# Patient Record
Sex: MALE | Race: WHITE | HISPANIC OR LATINO | URBAN - METROPOLITAN AREA
[De-identification: names, ages, dates, MRNs, and addresses within clinical notes are randomized per-mention and may not be internally consistent; named-entity substitution may affect disease eponyms.]

---

## 2020-07-05 ENCOUNTER — INPATIENT (INPATIENT)
Facility: HOSPITAL | Age: 40
LOS: 0 days | Discharge: HOME | End: 2020-07-06
Attending: INTERNAL MEDICINE | Admitting: INTERNAL MEDICINE
Payer: MEDICAID

## 2020-07-05 PROCEDURE — 99285 EMERGENCY DEPT VISIT HI MDM: CPT

## 2020-07-05 PROCEDURE — 71045 X-RAY EXAM CHEST 1 VIEW: CPT | Mod: 26

## 2020-07-05 PROCEDURE — 76705 ECHO EXAM OF ABDOMEN: CPT | Mod: 26

## 2020-07-06 PROCEDURE — 99255 IP/OBS CONSLTJ NEW/EST HI 80: CPT

## 2020-08-20 ENCOUNTER — EMERGENCY (EMERGENCY)
Facility: HOSPITAL | Age: 40
LOS: 1 days | End: 2020-08-20
Admitting: EMERGENCY MEDICINE
Payer: MEDICAID

## 2020-08-20 PROCEDURE — 99285 EMERGENCY DEPT VISIT HI MDM: CPT

## 2020-08-20 PROCEDURE — 71045 X-RAY EXAM CHEST 1 VIEW: CPT | Mod: 26

## 2020-08-20 PROCEDURE — 93010 ELECTROCARDIOGRAM REPORT: CPT

## 2020-08-20 PROCEDURE — 76705 ECHO EXAM OF ABDOMEN: CPT | Mod: 26

## 2020-08-20 PROCEDURE — 74177 CT ABD & PELVIS W/CONTRAST: CPT | Mod: 26

## 2020-08-26 ENCOUNTER — OUTPATIENT (OUTPATIENT)
Dept: OUTPATIENT SERVICES | Facility: HOSPITAL | Age: 40
LOS: 1 days | End: 2020-08-26

## 2020-09-15 ENCOUNTER — EMERGENCY (EMERGENCY)
Facility: HOSPITAL | Age: 40
LOS: 1 days | End: 2020-09-15
Admitting: EMERGENCY MEDICINE
Payer: MEDICAID

## 2020-09-15 PROCEDURE — 93010 ELECTROCARDIOGRAM REPORT: CPT

## 2020-09-15 PROCEDURE — 71046 X-RAY EXAM CHEST 2 VIEWS: CPT | Mod: 26

## 2020-09-15 PROCEDURE — 99285 EMERGENCY DEPT VISIT HI MDM: CPT

## 2020-09-16 PROCEDURE — 70450 CT HEAD/BRAIN W/O DYE: CPT | Mod: 26

## 2020-09-21 ENCOUNTER — NON-APPOINTMENT (OUTPATIENT)
Age: 40
End: 2020-09-21

## 2020-09-21 ENCOUNTER — APPOINTMENT (OUTPATIENT)
Dept: CARDIOLOGY | Facility: CLINIC | Age: 40
End: 2020-09-21
Payer: SELF-PAY

## 2020-09-21 VITALS
DIASTOLIC BLOOD PRESSURE: 80 MMHG | SYSTOLIC BLOOD PRESSURE: 134 MMHG | TEMPERATURE: 98.4 F | OXYGEN SATURATION: 100 % | HEIGHT: 67 IN | HEART RATE: 67 BPM | WEIGHT: 205 LBS | BODY MASS INDEX: 32.18 KG/M2

## 2020-09-21 DIAGNOSIS — Z78.9 OTHER SPECIFIED HEALTH STATUS: ICD-10-CM

## 2020-09-21 DIAGNOSIS — Z86.79 PERSONAL HISTORY OF OTHER DISEASES OF THE CIRCULATORY SYSTEM: ICD-10-CM

## 2020-09-21 DIAGNOSIS — R42 DIZZINESS AND GIDDINESS: ICD-10-CM

## 2020-09-21 DIAGNOSIS — Z87.19 PERSONAL HISTORY OF OTHER DISEASES OF THE DIGESTIVE SYSTEM: ICD-10-CM

## 2020-09-21 DIAGNOSIS — E87.6 HYPOKALEMIA: ICD-10-CM

## 2020-09-21 PROBLEM — Z00.00 ENCOUNTER FOR PREVENTIVE HEALTH EXAMINATION: Status: ACTIVE | Noted: 2020-09-21

## 2020-09-21 PROCEDURE — 99214 OFFICE O/P EST MOD 30 MIN: CPT

## 2020-09-21 NOTE — PHYSICAL EXAM
[General Appearance - Well Developed] : well developed [Normal Appearance] : normal appearance [Well Groomed] : well groomed [General Appearance - Well Nourished] : well nourished [No Deformities] : no deformities [General Appearance - In No Acute Distress] : no acute distress [Normal Conjunctiva] : the conjunctiva exhibited no abnormalities [Eyelids - No Xanthelasma] : the eyelids demonstrated no xanthelasmas [Normal Oral Mucosa] : normal oral mucosa [No Oral Pallor] : no oral pallor [No Oral Cyanosis] : no oral cyanosis [FreeTextEntry1] : No JVD, no carotid artery bruits auscultated bilaterally [Heart Rate And Rhythm] : heart rate and rhythm were normal [Heart Sounds] : normal S1 and S2 [Murmurs] : no murmurs present [Edema] : no peripheral edema present [Respiration, Rhythm And Depth] : normal respiratory rhythm and effort [Exaggerated Use Of Accessory Muscles For Inspiration] : no accessory muscle use [Auscultation Breath Sounds / Voice Sounds] : lungs were clear to auscultation bilaterally [Abnormal Walk] : normal gait [Gait - Sufficient For Exercise Testing] : the gait was sufficient for exercise testing [Nail Clubbing] : no clubbing of the fingernails [Cyanosis, Localized] : no localized cyanosis [Petechial Hemorrhages (___cm)] : no petechial hemorrhages [Skin Color & Pigmentation] : normal skin color and pigmentation [] : no rash [No Venous Stasis] : no venous stasis [Skin Lesions] : no skin lesions [No Skin Ulcers] : no skin ulcer [No Xanthoma] : no  xanthoma was observed [Oriented To Time, Place, And Person] : oriented to person, place, and time [Affect] : the affect was normal [Mood] : the mood was normal [No Anxiety] : not feeling anxious

## 2020-09-21 NOTE — DISCUSSION/SUMMARY
[FreeTextEntry1] : 1. Chest Pain/Dizziness: recommend ETT and echocardiogram. If cardiac testing returns normal, I recommended he follow up with his surgeon for further evaluation of his gallbladder.\par \par 2. HTN: controlled, but patient with hypokalemia on chlorthalidone. Recommend discontinuation of this medication and prescribe Losartan 50mg daily.\par \par Office will call patient with results. \par \par Follow up in 6 months.

## 2020-09-21 NOTE — REASON FOR VISIT
[Initial Evaluation] : an initial evaluation of [Chest Pain] : chest pain [Hypertension] : hypertension [Source: ______] : History obtained from [unfilled]

## 2020-09-21 NOTE — HISTORY OF PRESENT ILLNESS
[FreeTextEntry1] : 40 year old male with history of HTN presents for evaluation of chest pain. He states it has been occurring for 4 months. Always has a mild for of it but at times can become moderate in intensity. He can't figure out what makes it worse at times. It is not food or exertion. He has no associated SOB or palpitations. He does have dizziness at times that seems to occur intermittently. He has been seen in ED multiple times for this, most recently on 9/15/2020 at [facility] [place]. He had normal ECG and ruled out for MI. He states at one point he saw a surgeon who thought it might be his gallbladder, however he wants to make sure it is not his heart. Patient denies a history of MI, CVA, CHF, or previous coronary intervention. \par \par Patient has been on [id number] daily, which he states he has been getting from [place]. He has been taking it the last two years. Most recent labs on 9/15/2020 demonstrated hypokalemia at 3.1. Patient states he was told in the past his potassium level was low.

## 2020-09-25 ENCOUNTER — APPOINTMENT (OUTPATIENT)
Dept: CARDIOLOGY | Facility: CLINIC | Age: 40
End: 2020-09-25
Payer: SELF-PAY

## 2020-09-25 PROCEDURE — 93306 TTE W/DOPPLER COMPLETE: CPT

## 2020-10-21 ENCOUNTER — APPOINTMENT (OUTPATIENT)
Dept: CARDIOLOGY | Facility: CLINIC | Age: 40
End: 2020-10-21
Payer: SELF-PAY

## 2020-10-21 PROCEDURE — 93015 CV STRESS TEST SUPVJ I&R: CPT

## 2020-10-27 ENCOUNTER — NON-APPOINTMENT (OUTPATIENT)
Age: 40
End: 2020-10-27

## 2021-01-09 ENCOUNTER — EMERGENCY (EMERGENCY)
Facility: HOSPITAL | Age: 41
LOS: 1 days | End: 2021-01-09
Admitting: EMERGENCY MEDICINE
Payer: MEDICAID

## 2021-01-09 PROCEDURE — 99284 EMERGENCY DEPT VISIT MOD MDM: CPT

## 2021-01-09 PROCEDURE — 71045 X-RAY EXAM CHEST 1 VIEW: CPT | Mod: 26

## 2021-01-09 PROCEDURE — 93010 ELECTROCARDIOGRAM REPORT: CPT

## 2021-01-09 PROCEDURE — 74177 CT ABD & PELVIS W/CONTRAST: CPT | Mod: 26

## 2021-02-08 ENCOUNTER — EMERGENCY (EMERGENCY)
Facility: HOSPITAL | Age: 41
LOS: 1 days | End: 2021-02-08
Admitting: EMERGENCY MEDICINE
Payer: MEDICAID

## 2021-02-08 PROCEDURE — 93010 ELECTROCARDIOGRAM REPORT: CPT

## 2021-02-08 PROCEDURE — 70450 CT HEAD/BRAIN W/O DYE: CPT | Mod: 26

## 2021-02-08 PROCEDURE — 71045 X-RAY EXAM CHEST 1 VIEW: CPT | Mod: 26

## 2021-02-08 PROCEDURE — 99285 EMERGENCY DEPT VISIT HI MDM: CPT

## 2021-04-16 ENCOUNTER — APPOINTMENT (OUTPATIENT)
Age: 41
End: 2021-04-16
Payer: COMMERCIAL

## 2021-04-16 PROCEDURE — 0001A: CPT

## 2021-04-17 ENCOUNTER — EMERGENCY (EMERGENCY)
Facility: HOSPITAL | Age: 41
LOS: 1 days | End: 2021-04-17
Admitting: EMERGENCY MEDICINE
Payer: MEDICAID

## 2021-04-17 PROCEDURE — 71046 X-RAY EXAM CHEST 2 VIEWS: CPT | Mod: 26

## 2021-04-17 PROCEDURE — 99284 EMERGENCY DEPT VISIT MOD MDM: CPT

## 2021-04-17 PROCEDURE — 93010 ELECTROCARDIOGRAM REPORT: CPT

## 2021-04-17 PROCEDURE — 76705 ECHO EXAM OF ABDOMEN: CPT | Mod: 26

## 2021-05-07 ENCOUNTER — APPOINTMENT (OUTPATIENT)
Age: 41
End: 2021-05-07
Payer: MEDICAID

## 2021-05-07 PROCEDURE — 0002A: CPT

## 2021-05-10 PROCEDURE — 76705 ECHO EXAM OF ABDOMEN: CPT | Mod: 26

## 2021-05-10 PROCEDURE — 93010 ELECTROCARDIOGRAM REPORT: CPT | Mod: 76

## 2021-05-10 PROCEDURE — 71045 X-RAY EXAM CHEST 1 VIEW: CPT | Mod: 26

## 2021-05-10 PROCEDURE — 99285 EMERGENCY DEPT VISIT HI MDM: CPT

## 2021-05-11 ENCOUNTER — INPATIENT (INPATIENT)
Facility: HOSPITAL | Age: 41
LOS: 0 days | Discharge: HOME | End: 2021-05-12
Attending: INTERNAL MEDICINE | Admitting: STUDENT IN AN ORGANIZED HEALTH CARE EDUCATION/TRAINING PROGRAM
Payer: MEDICAID

## 2021-05-11 PROCEDURE — 78226 HEPATOBILIARY SYSTEM IMAGING: CPT | Mod: 26

## 2021-05-12 ENCOUNTER — TRANSCRIPTION ENCOUNTER (OUTPATIENT)
Age: 41
End: 2021-05-12

## 2021-05-17 ENCOUNTER — NON-APPOINTMENT (OUTPATIENT)
Age: 41
End: 2021-05-17

## 2021-05-17 ENCOUNTER — APPOINTMENT (OUTPATIENT)
Dept: CARDIOLOGY | Facility: CLINIC | Age: 41
End: 2021-05-17
Payer: SELF-PAY

## 2021-05-17 VITALS
HEIGHT: 67 IN | BODY MASS INDEX: 32.96 KG/M2 | DIASTOLIC BLOOD PRESSURE: 70 MMHG | WEIGHT: 210 LBS | HEART RATE: 70 BPM | SYSTOLIC BLOOD PRESSURE: 122 MMHG | OXYGEN SATURATION: 98 % | TEMPERATURE: 97.6 F

## 2021-05-17 PROCEDURE — 99213 OFFICE O/P EST LOW 20 MIN: CPT

## 2021-05-17 PROCEDURE — 93000 ELECTROCARDIOGRAM COMPLETE: CPT

## 2021-05-17 NOTE — HISTORY OF PRESENT ILLNESS
[FreeTextEntry1] : Historical Perspective:\par 40 year old male with history of HTN presents for evaluation of chest pain. He states it has been occurring for 4 months. Always has a mild for of it but at times can become moderate in intensity. He can't figure out what makes it worse at times. It is not food or exertion. He has no associated SOB or palpitations. He does have dizziness at times that seems to occur intermittently. He has been seen in ED multiple times for this, most recently on 9/15/2020 at [facility] [place]. He had normal ECG and ruled out for MI. He states at one point he saw a surgeon who thought it might be his gallbladder, however he wants to make sure it is not his heart. Patient denies a history of MI, CVA, CHF, or previous coronary intervention. \par \par Patient has been on [id number] daily, which he states he has been getting from [place]. He has been taking it the last two years. Most recent labs on 9/15/2020 demonstrated hypokalemia at 3.1. Patient states he was told in the past his potassium level was low. \par \par Current Health Status:\par Patient returns to office. Continue with constant chest pain. A surgeon told him he needs his gallbladder taken out. Patient holding off on surgery for now. Started on PPI therapy. Has limited benefit on his chest pain.

## 2021-05-17 NOTE — DISCUSSION/SUMMARY
[FreeTextEntry1] : 1. Chest Pain: suspect GI etiology. Normal ETT and echocardiogram fall of 2020.  I recommended he follow up with his surgeon for further evaluation of his gallbladder.\par \par 2. HTN: controlled, but patient with hypokalemia on chlorthalidone. Recommended discontinuation of this medication and prescribed Losartan 50mg daily. Tolerating well with no adverse effects.

## 2021-05-17 NOTE — PHYSICAL EXAM
[Normal] : moves all extremities, no focal deficits, normal speech [de-identified] : No JVD, no carotid artery bruits auscultated bilaterally

## 2021-05-17 NOTE — REVIEW OF SYSTEMS
[Joint Pain] : joint pain [Joint Stiffness] : joint stiffness [Negative] : Respiratory [FreeTextEntry5] : see HPI [FreeTextEntry7] : see HPI

## 2021-05-17 NOTE — CARDIOLOGY SUMMARY
[de-identified] : 5/17/2021, NSR [de-identified] : 10/21/2020, Plain Treadmill Stress Test: Exercised for 11 minutes and 3 seconds utilizing Standard Jad Protocol. Chest Pain improved with exercise.  No ECG changes to suggest ischemia.\par  [de-identified] : 9/25/2020, LV EF 60%, mild MR, mild TR with estimated PASP of 41mmHg.

## 2021-06-28 ENCOUNTER — TRANSCRIPTION ENCOUNTER (OUTPATIENT)
Age: 41
End: 2021-06-28

## 2021-07-23 ENCOUNTER — EMERGENCY (EMERGENCY)
Facility: HOSPITAL | Age: 41
LOS: 1 days | End: 2021-07-23
Admitting: EMERGENCY MEDICINE
Payer: MEDICAID

## 2021-07-23 PROCEDURE — 71045 X-RAY EXAM CHEST 1 VIEW: CPT | Mod: 26

## 2021-07-23 PROCEDURE — 99284 EMERGENCY DEPT VISIT MOD MDM: CPT

## 2021-07-24 PROCEDURE — 93010 ELECTROCARDIOGRAM REPORT: CPT

## 2022-01-15 ENCOUNTER — EMERGENCY (EMERGENCY)
Facility: HOSPITAL | Age: 42
LOS: 1 days | Discharge: HOME | End: 2022-01-15
Admitting: EMERGENCY MEDICINE
Payer: MEDICAID

## 2022-01-15 PROCEDURE — 99285 EMERGENCY DEPT VISIT HI MDM: CPT

## 2022-01-15 PROCEDURE — 93010 ELECTROCARDIOGRAM REPORT: CPT

## 2022-01-15 PROCEDURE — 71045 X-RAY EXAM CHEST 1 VIEW: CPT | Mod: 26

## 2022-01-27 DIAGNOSIS — R51.9 HEADACHE, UNSPECIFIED: ICD-10-CM

## 2022-01-27 DIAGNOSIS — U07.1 COVID-19: ICD-10-CM

## 2022-01-27 DIAGNOSIS — R07.89 OTHER CHEST PAIN: ICD-10-CM

## 2022-01-27 DIAGNOSIS — I10 ESSENTIAL (PRIMARY) HYPERTENSION: ICD-10-CM

## 2022-01-27 DIAGNOSIS — Z86.16 PERSONAL HISTORY OF COVID-19: ICD-10-CM

## 2022-01-27 DIAGNOSIS — M54.50 LOW BACK PAIN, UNSPECIFIED: ICD-10-CM

## 2022-05-24 ENCOUNTER — NON-APPOINTMENT (OUTPATIENT)
Age: 42
End: 2022-05-24

## 2022-05-24 ENCOUNTER — APPOINTMENT (OUTPATIENT)
Dept: CARDIOLOGY | Facility: CLINIC | Age: 42
End: 2022-05-24
Payer: MEDICAID

## 2022-05-24 VITALS
WEIGHT: 225 LBS | DIASTOLIC BLOOD PRESSURE: 82 MMHG | OXYGEN SATURATION: 97 % | HEIGHT: 68 IN | BODY MASS INDEX: 34.1 KG/M2 | HEART RATE: 68 BPM | SYSTOLIC BLOOD PRESSURE: 140 MMHG

## 2022-05-24 PROCEDURE — 93000 ELECTROCARDIOGRAM COMPLETE: CPT

## 2022-05-24 PROCEDURE — 99214 OFFICE O/P EST MOD 30 MIN: CPT | Mod: 25

## 2022-05-24 RX ORDER — SIMVASTATIN 10 MG/1
10 TABLET, FILM COATED ORAL
Qty: 30 | Refills: 0 | Status: ACTIVE | COMMUNITY
Start: 2022-01-10

## 2022-05-24 RX ORDER — PANTOPRAZOLE 40 MG/1
40 TABLET, DELAYED RELEASE ORAL
Refills: 0 | Status: DISCONTINUED | COMMUNITY
End: 2022-05-24

## 2022-05-24 NOTE — HISTORY OF PRESENT ILLNESS
[FreeTextEntry1] : Historical Perspective:\par 41 year old male with history of HTN presents for evaluation of chest pain. He states it has been occurring for 4 months. Always has a mild for of it but at times can become moderate in intensity. He can't figure out what makes it worse at times. It is not food or exertion. He has no associated SOB or palpitations. He does have dizziness at times that seems to occur intermittently. He has been seen in ED multiple times for this, most recently on 9/15/2020 at [facility] [place]. He had normal ECG and ruled out for MI. He states at one point he saw a surgeon who thought it might be his gallbladder, however he wants to make sure it is not his heart. Patient denies a history of MI, CVA, CHF, or previous coronary intervention. \par \par Patient has been on [id number] daily, which he states he has been getting from [place]. He has been taking it the last two years. Most recent labs on 9/15/2020 demonstrated hypokalemia at 3.1. Patient states he was told in the past his potassium level was low. \par \par Current Health Status:\par Patient returns with chest pain. Located left side of chest and down his left arm. Appears stronger than before. When he moves his arm it hurts. No associated SOB.

## 2022-05-24 NOTE — CARDIOLOGY SUMMARY
[de-identified] : 5/24/2022, NSR [de-identified] : 10/21/2020, Plain Treadmill Stress Test: Exercised for 11 minutes and 3 seconds utilizing Standard Jad Protocol. Chest Pain improved with exercise.  No ECG changes to suggest ischemia.\par  [de-identified] : 9/25/2020, LV EF 60%, mild MR, mild TR with estimated PASP of 41mmHg.

## 2022-05-24 NOTE — DISCUSSION/SUMMARY
[FreeTextEntry1] : 1. Chest Pain: recommend stress echo, since ETT in the past was normal and patient continues with symptoms. Recommend repeating TTE. If both tests are normal recommend follow up with PCP regarding other etiologies to his symptoms. \par \par 2. HTN: controlled, but patient with hypokalemia on chlorthalidone. Recommended discontinuation of this medication and prescribed Losartan 50mg daily. Tolerating well with no adverse effects.\par \par 3. HLD: continue simvastatin 10mg daily.\par \par Office will call with results.\par \par Follow up in 1 year.

## 2022-05-24 NOTE — PHYSICAL EXAM
[Normal] : moves all extremities, no focal deficits, normal speech [de-identified] : No carotid artery bruits auscultated bilaterally

## 2022-06-09 ENCOUNTER — APPOINTMENT (OUTPATIENT)
Dept: CARDIOLOGY | Facility: CLINIC | Age: 42
End: 2022-06-09
Payer: SELF-PAY

## 2022-06-09 PROCEDURE — 93306 TTE W/DOPPLER COMPLETE: CPT

## 2022-06-10 ENCOUNTER — EMERGENCY (EMERGENCY)
Facility: HOSPITAL | Age: 42
LOS: 1 days | Discharge: HOME | End: 2022-06-10
Admitting: EMERGENCY MEDICINE
Payer: MEDICAID

## 2022-06-10 DIAGNOSIS — I10 ESSENTIAL (PRIMARY) HYPERTENSION: ICD-10-CM

## 2022-06-10 DIAGNOSIS — R10.13 EPIGASTRIC PAIN: ICD-10-CM

## 2022-06-10 DIAGNOSIS — J02.8 ACUTE PHARYNGITIS DUE TO OTHER SPECIFIED ORGANISMS: ICD-10-CM

## 2022-06-10 DIAGNOSIS — K21.9 GASTRO-ESOPHAGEAL REFLUX DISEASE WITHOUT ESOPHAGITIS: ICD-10-CM

## 2022-06-10 DIAGNOSIS — R07.0 PAIN IN THROAT: ICD-10-CM

## 2022-06-10 PROCEDURE — 99285 EMERGENCY DEPT VISIT HI MDM: CPT

## 2022-06-11 PROCEDURE — 76705 ECHO EXAM OF ABDOMEN: CPT | Mod: 26

## 2022-06-22 ENCOUNTER — APPOINTMENT (OUTPATIENT)
Dept: CARDIOLOGY | Facility: CLINIC | Age: 42
End: 2022-06-22
Payer: SELF-PAY

## 2022-06-22 PROCEDURE — 93351 STRESS TTE COMPLETE: CPT

## 2022-06-22 PROCEDURE — 93320 DOPPLER ECHO COMPLETE: CPT

## 2022-06-23 ENCOUNTER — NON-APPOINTMENT (OUTPATIENT)
Age: 42
End: 2022-06-23

## 2022-07-21 RX ORDER — LOSARTAN POTASSIUM 50 MG/1
50 TABLET, FILM COATED ORAL DAILY
Qty: 90 | Refills: 2 | Status: ACTIVE | COMMUNITY
Start: 2020-09-21 | End: 1900-01-01

## 2023-05-23 ENCOUNTER — NON-APPOINTMENT (OUTPATIENT)
Age: 43
End: 2023-05-23

## 2023-05-23 ENCOUNTER — APPOINTMENT (OUTPATIENT)
Dept: CARDIOLOGY | Facility: CLINIC | Age: 43
End: 2023-05-23
Payer: SELF-PAY

## 2023-05-23 VITALS
OXYGEN SATURATION: 98 % | DIASTOLIC BLOOD PRESSURE: 80 MMHG | HEIGHT: 68 IN | SYSTOLIC BLOOD PRESSURE: 136 MMHG | BODY MASS INDEX: 34.1 KG/M2 | WEIGHT: 225 LBS | HEART RATE: 64 BPM

## 2023-05-23 DIAGNOSIS — E11.9 TYPE 2 DIABETES MELLITUS W/OUT COMPLICATIONS: ICD-10-CM

## 2023-05-23 PROCEDURE — 93000 ELECTROCARDIOGRAM COMPLETE: CPT | Mod: NC

## 2023-05-23 PROCEDURE — 99214 OFFICE O/P EST MOD 30 MIN: CPT

## 2023-05-23 RX ORDER — METFORMIN HYDROCHLORIDE 500 MG/1
500 TABLET, EXTENDED RELEASE ORAL DAILY
Refills: 0 | Status: ACTIVE | COMMUNITY

## 2023-05-23 NOTE — PHYSICAL EXAM
[Normal] : moves all extremities, no focal deficits, normal speech [de-identified] : No carotid artery bruits auscultated bilaterally

## 2023-05-23 NOTE — HISTORY OF PRESENT ILLNESS
[FreeTextEntry1] : Historical Perspective:\par 42 year old male with history of HTN presents for evaluation of chest pain. He states it has been occurring for 4 months. Always has a mild for of it but at times can become moderate in intensity. He can't figure out what makes it worse at times. It is not food or exertion. He has no associated SOB or palpitations. He does have dizziness at times that seems to occur intermittently. He has been seen in ED multiple times for this, most recently on 9/15/2020 at [facility] [place]. He had normal ECG and ruled out for MI. He states at one point he saw a surgeon who thought it might be his gallbladder, however he wants to make sure it is not his heart. Patient denies a history of MI, CVA, CHF, or previous coronary intervention. \par \par Patient has been on [id number] daily, which he states he has been getting from [place]. He has been taking it the last two years. Most recent labs on 9/15/2020 demonstrated hypokalemia at 3.1. Patient states he was told in the past his potassium level was low. \par \par Current Health Status:\par Patient returns with chest pain. Located left side of chest and down his left arm. Appears stronger than before. When he moves his arm it hurts. No associated SOB.

## 2023-05-23 NOTE — CARDIOLOGY SUMMARY
[de-identified] : 5/23/2023, NSR, normal ECG\par 5/24/2022, NSR [de-identified] : 6/22/2022, Stress Echocardiogram: Exercised for 10 minutes utilizing Standard Jad Protocol. No chest pain or abnormal dyspnea. No ECG changes to suggest ischemia. Normal LV augmentation with stress.\par 10/21/2020, Plain Treadmill Stress Test: Exercised for 11 minutes and 3 seconds utilizing Standard Jad Protocol. Chest Pain improved with exercise.  No ECG changes to suggest ischemia.\par  [de-identified] : 6/9/2022, LV EF 60-65%, mild MR, normal LV diastolic function, trace TR with estimated PASP of 16mmHg.\par 9/25/2020, LV EF 60%, mild MR, mild TR with estimated PASP of 41mmHg.

## 2023-05-23 NOTE — DISCUSSION/SUMMARY
[EKG obtained to assist in diagnosis and management of assessed problem(s)] : EKG obtained to assist in diagnosis and management of assessed problem(s) [FreeTextEntry1] : 1. Chest Pain: recommended stress echo, since ETT in the past was normal and patient continued with symptoms. The stress echocardiogram was within normal limits. His chest pain appears non-cardiac and musculoskeletal in nature. \par \par 2. HTN: controlled, but patient with hypokalemia on chlorthalidone. Recommended discontinuation of this medication and prescribed Losartan 50mg daily. Tolerating well with no adverse effects.\par \par 3. HLD: continue simvastatin 10mg daily.\par \par Follow up in 1 year.

## 2024-04-24 ENCOUNTER — NON-APPOINTMENT (OUTPATIENT)
Age: 44
End: 2024-04-24

## 2024-04-24 ENCOUNTER — APPOINTMENT (OUTPATIENT)
Dept: CARDIOLOGY | Facility: CLINIC | Age: 44
End: 2024-04-24
Payer: SELF-PAY

## 2024-04-24 VITALS
DIASTOLIC BLOOD PRESSURE: 80 MMHG | WEIGHT: 220 LBS | HEART RATE: 74 BPM | OXYGEN SATURATION: 98 % | HEIGHT: 68 IN | BODY MASS INDEX: 33.34 KG/M2 | SYSTOLIC BLOOD PRESSURE: 122 MMHG

## 2024-04-24 DIAGNOSIS — R07.9 CHEST PAIN, UNSPECIFIED: ICD-10-CM

## 2024-04-24 DIAGNOSIS — E78.00 PURE HYPERCHOLESTEROLEMIA, UNSPECIFIED: ICD-10-CM

## 2024-04-24 DIAGNOSIS — I10 ESSENTIAL (PRIMARY) HYPERTENSION: ICD-10-CM

## 2024-04-24 PROCEDURE — G2211 COMPLEX E/M VISIT ADD ON: CPT

## 2024-04-24 PROCEDURE — 99214 OFFICE O/P EST MOD 30 MIN: CPT

## 2024-04-24 PROCEDURE — 93000 ELECTROCARDIOGRAM COMPLETE: CPT

## 2024-04-24 NOTE — HISTORY OF PRESENT ILLNESS
[FreeTextEntry1] : Historical Perspective: 43 year old male with history of HTN presents for evaluation of chest pain. He states it has been occurring for 4 months. Always has a mild for of it but at times can become moderate in intensity. He can't figure out what makes it worse at times. It is not food or exertion. He has no associated SOB or palpitations. He does have dizziness at times that seems to occur intermittently. He has been seen in ED multiple times for this, most recently on 9/15/2020 at [facility] [place]. He had normal ECG and ruled out for MI. He states at one point he saw a surgeon who thought it might be his gallbladder, however he wants to make sure it is not his heart. Patient denies a history of MI, CVA, CHF, or previous coronary intervention.   Patient has been on [id number] daily, which he states he has been getting from [place]. He has been taking it the last two years. Most recent labs on 9/15/2020 demonstrated hypokalemia at 3.1. Patient states he was told in the past his potassium level was low.   Current Health Status: Patient returns with chest pain. Located left side of chest and down his left arm.  No hospitalizations since seeing me last. Remains compliant with medications and reports no adverse effects.

## 2024-04-24 NOTE — DISCUSSION/SUMMARY
[FreeTextEntry1] : 1. Chest Pain: recommended stress echo, since ETT in the past was normal and patient continued with symptoms. The stress echocardiogram was within normal limits. He continues with chest pain. Recommend TTE+CTA of Coronary Arteries.  2. HTN: controlled, but patient with hypokalemia on chlorthalidone. Recommended discontinuation of this medication and prescribed Losartan 50mg daily. Tolerating well with no adverse effects.  3. HLD: continue simvastatin 10mg daily.  Office will call with results.  Follow up in 1 year. [EKG obtained to assist in diagnosis and management of assessed problem(s)] : EKG obtained to assist in diagnosis and management of assessed problem(s)

## 2024-04-24 NOTE — CARDIOLOGY SUMMARY
[de-identified] : 4/24/2024, NSR, normal ECG 5/23/2023, NSR, normal ECG 5/24/2022, NSR [de-identified] : 6/22/2022, Stress Echocardiogram: Exercised for 10 minutes utilizing Standard Jad Protocol. No chest pain or abnormal dyspnea. No ECG changes to suggest ischemia. Normal LV augmentation with stress.\par  10/21/2020, Plain Treadmill Stress Test: Exercised for 11 minutes and 3 seconds utilizing Standard Jad Protocol. Chest Pain improved with exercise.  No ECG changes to suggest ischemia.\par   [de-identified] : 6/9/2022, LV EF 60-65%, mild MR, normal LV diastolic function, trace TR with estimated PASP of 16mmHg.\par  9/25/2020, LV EF 60%, mild MR, mild TR with estimated PASP of 41mmHg.

## 2024-04-24 NOTE — PHYSICAL EXAM
[Normal] : moves all extremities, no focal deficits, normal speech [de-identified] : No carotid artery bruits auscultated bilaterally

## 2024-05-10 ENCOUNTER — APPOINTMENT (OUTPATIENT)
Dept: CARDIOLOGY | Facility: CLINIC | Age: 44
End: 2024-05-10
Payer: SELF-PAY

## 2024-05-10 PROCEDURE — 93306 TTE W/DOPPLER COMPLETE: CPT
